# Patient Record
Sex: FEMALE | Race: AMERICAN INDIAN OR ALASKA NATIVE | NOT HISPANIC OR LATINO
[De-identification: names, ages, dates, MRNs, and addresses within clinical notes are randomized per-mention and may not be internally consistent; named-entity substitution may affect disease eponyms.]

---

## 2022-01-24 PROBLEM — Z00.00 ENCOUNTER FOR PREVENTIVE HEALTH EXAMINATION: Status: ACTIVE | Noted: 2022-01-24

## 2022-02-13 ENCOUNTER — LABORATORY RESULT (OUTPATIENT)
Age: 67
End: 2022-02-13

## 2022-02-16 ENCOUNTER — APPOINTMENT (OUTPATIENT)
Dept: NEUROSURGERY | Facility: HOSPITAL | Age: 67
End: 2022-02-16

## 2022-04-29 ENCOUNTER — APPOINTMENT (OUTPATIENT)
Dept: NEUROSURGERY | Facility: CLINIC | Age: 67
End: 2022-04-29
Payer: MEDICARE

## 2022-04-29 VITALS — HEIGHT: 62 IN | WEIGHT: 130 LBS | BODY MASS INDEX: 23.92 KG/M2

## 2022-04-29 PROCEDURE — 99204 OFFICE O/P NEW MOD 45 MIN: CPT

## 2022-04-29 NOTE — ASSESSMENT
[FreeTextEntry1] : I discussed with pt at length along with her daughter regarding her condition and treatment options including surgical decompression with interlaminar stabilization at L4-5 along with possible left L5-S1 microdiskectomy. She is still apprehensive about surgical intervention at this time. She does also describe urinary frequency and urgency in this past year . I believe it is a matter of time she will need surgical intervention. She would also like to have her son here from [place] before decision to be made about surgery in his presence. She is not able to travel long distance at this time due to her lumbar stenosis condition. Meanwhile she will return to see her pain specialist for further treatments. I will be monitoring her condition as well.

## 2022-04-29 NOTE — HISTORY OF PRESENT ILLNESS
[de-identified] : 66 year old lady with known history of severe lumbar stenosis at L4-5 and left radiculopathy related to the stenosis and possibly to herniated disc at L5-S1. Currently still suffering from left more than right low back pain. In addition there is intense paresthesia in the left foot mostly in the L5 distribution. Symptoms worsens with sitting, walking and standing. Lying down helps her some. Over 3 months ago she benefited from injection therapy to her low back by her pain specialist, Dr. Vogt. Unfortunately her symptomjs have now returned.

## 2022-08-19 ENCOUNTER — APPOINTMENT (OUTPATIENT)
Dept: NEUROSURGERY | Facility: CLINIC | Age: 67
End: 2022-08-19

## 2022-08-19 PROCEDURE — 99213 OFFICE O/P EST LOW 20 MIN: CPT

## 2022-08-19 NOTE — HISTORY OF PRESENT ILLNESS
[FreeTextEntry1] : 67  year old female present for follow-up. Patient hx of severe lumbar stenosis at L4-5, possible L5-S1 disc herniation with left leg radicular symptoms w/ associated paresthesia. Today she reports improvement in her symptoms. Reports her left leg radicular symptoms improved significantly. Her biggest complaint is the numbness in her left toe and foot that she describes as constant. She does endorse with time that she feels that it gets better with ambulation. Endorse being able to walk multiple blocks now compared to the past. Still endorses some urinary frequency but able to make it to the bathroom. Denies any incontinence. She continues to participate with physical therapy and massages with relief. Uses lidocaine patch for her lower back pain with relief as well.\par \par

## 2022-08-19 NOTE — ASSESSMENT
[FreeTextEntry1] : 67 year old female present for follow up of her lumbar stenosis. Previously, we have discussed surgical intervention in the past. Patient was previously hesistant and was monitored conservatively. Today, patient reports improvement in her left leg radicular symptoms but still having left foot numbness that is stable/slightly improved. We discussed her previous MRI lumbar spine at length. We discussed surgical intervention again however given improvement in her symptoms during this visit, we will hold off at this time. Continue physical therapy as needed. Patient endorses using patches for her lower back pain and neuropathic pain medications PRN at home for her foot numbness. She reports she requires no refills and we will continue to follow her. Follow up in 3-4 months for re-evaluation of symptoms.

## 2022-08-19 NOTE — PHYSICAL EXAM
[FreeTextEntry1] : awake alert in no acute distress\par Speech clear and coherent\par Full ROM of lumbar spine\par Moves all ext x 4 with full and equal strength\par Reports numbness in the left foot L5 distribution stable\par Otherwise sensation grossly intact

## 2022-12-09 ENCOUNTER — APPOINTMENT (OUTPATIENT)
Dept: NEUROSURGERY | Facility: CLINIC | Age: 67
End: 2022-12-09

## 2022-12-09 PROCEDURE — 99213 OFFICE O/P EST LOW 20 MIN: CPT

## 2022-12-09 NOTE — HISTORY OF PRESENT ILLNESS
[FreeTextEntry1] : 67 year old female present for follow-up hx of lumbar stenosis @ L4-5,L5-S1. Having lower back pain w/ left leg radicular symptoms. There is associated numbness as well; worse in the foot. Walking for prolonged period of time exacerbate the symptom. She feels since her previous visit she feels her symptoms have improved mildly but is persistent.\par \par She does not take the gabapentin secondary to GI upset but does feel it helps. She feels the flexeril helps with her symptoms. Other conservative management include physical therapy and massage that helps with her symptoms. She has trialled epidural steroid injection in the past that improved in her symptoms.  \par \par Today she also complains of a right hand numbness w/ pain near the ulnar nerve. This has been persistent for many years; acutely exacerbated recently. Worse at night.

## 2022-12-09 NOTE — ASSESSMENT
[FreeTextEntry1] : 67 year old female here for follow-up of her lower back pain. No imaging available for review during this visit. Treatment options discussed with patient at length. Recommend continue conservative management, physical therapy and massage. Recommend epidural steroid injection as it has help patient in the past. Patient has a pain management doctor already to call to schedule KATY if desired. Follow up with me in 3 months\par \par In terms of her right elbow pain; discuss seeing orthopedic surgeon to eval. Patient will consider. In terms of her numbness, recommend vitamin B6, wrist splint, and EMG of bilateral UE. Patient verbalize understanding but will like to continue to monitor.

## 2022-12-09 NOTE — PHYSICAL EXAM
[FreeTextEntry1] : awake alert in no acute distress\par Eyes midline\par Speech clear and coherent \par Moves all ext x 4 with full and equal strength\par Senstaion grossly intact\par DTR intact\par Ambulates w/ steady gait\par Negative straight leg raise\par Positive tinel sign\par

## 2023-06-23 ENCOUNTER — APPOINTMENT (OUTPATIENT)
Dept: NEUROSURGERY | Facility: CLINIC | Age: 68
End: 2023-06-23
Payer: MEDICARE

## 2023-06-23 PROCEDURE — 99214 OFFICE O/P EST MOD 30 MIN: CPT
